# Patient Record
Sex: FEMALE | Race: WHITE
[De-identification: names, ages, dates, MRNs, and addresses within clinical notes are randomized per-mention and may not be internally consistent; named-entity substitution may affect disease eponyms.]

---

## 2017-01-19 ENCOUNTER — HOSPITAL ENCOUNTER (OUTPATIENT)
Dept: HOSPITAL 47 - LABWHC1 | Age: 22
Discharge: HOME | End: 2017-01-19
Payer: COMMERCIAL

## 2017-01-19 DIAGNOSIS — Z34.82: Primary | ICD-10-CM

## 2017-01-19 LAB
CH: 31.2
CHCM: 33
ERYTHROCYTE [DISTWIDTH] IN BLOOD BY AUTOMATED COUNT: 4.06 M/UL (ref 3.8–5.4)
ERYTHROCYTE [DISTWIDTH] IN BLOOD: 13 % (ref 11.5–15.5)
HCT VFR BLD AUTO: 38.5 % (ref 34–46)
HDW: 2.62
HGB BLD-MCNC: 12.5 GM/DL (ref 11.4–16)
MCH RBC QN AUTO: 30.7 PG (ref 25–35)
MCHC RBC AUTO-ENTMCNC: 32.3 G/DL (ref 31–37)
MCV RBC AUTO: 95 FL (ref 80–100)
WBC # BLD AUTO: 8.9 K/UL (ref 3.8–10.6)

## 2017-01-19 PROCEDURE — 82950 GLUCOSE TEST: CPT

## 2017-01-19 PROCEDURE — 85027 COMPLETE CBC AUTOMATED: CPT

## 2017-01-19 PROCEDURE — 36415 COLL VENOUS BLD VENIPUNCTURE: CPT

## 2017-02-12 ENCOUNTER — HOSPITAL ENCOUNTER (OUTPATIENT)
Dept: HOSPITAL 47 - FBPOP | Age: 22
Discharge: HOME | End: 2017-02-12
Payer: COMMERCIAL

## 2017-02-12 DIAGNOSIS — O99.89: Primary | ICD-10-CM

## 2017-02-12 DIAGNOSIS — R10.84: ICD-10-CM

## 2017-02-12 DIAGNOSIS — Z3A.30: ICD-10-CM

## 2017-02-12 PROCEDURE — 84112 EVAL AMNIOTIC FLUID PROTEIN: CPT

## 2017-02-12 PROCEDURE — 99213 OFFICE O/P EST LOW 20 MIN: CPT

## 2017-02-12 PROCEDURE — 59025 FETAL NON-STRESS TEST: CPT

## 2017-03-03 ENCOUNTER — HOSPITAL ENCOUNTER (OUTPATIENT)
Dept: HOSPITAL 47 - RADUSWWP | Age: 22
End: 2017-03-03
Payer: COMMERCIAL

## 2017-03-03 DIAGNOSIS — O36.63X0: Primary | ICD-10-CM

## 2017-03-03 PROCEDURE — 76811 OB US DETAILED SNGL FETUS: CPT

## 2017-03-03 NOTE — US
EXAMINATION TYPE: US OB fetal anatomy transabd

 

DATE OF EXAM: 3/3/2017 2:21 PM

 

COMPARISON: US in PACS

 

HISTORY: LGA

 

TECHNIQUE:  Transabdominal (TA)

 

EXAM MEASUREMENTS:

 

GESTATIONAL AGE / DATING

Physician Established: (32 weeks/5 days)

** EDC:  04/23/2017

Dates by LMP: 

Dates by First Scan:  (32 weeks/5 days)

** EDC: 04/23/2017

Dates by Current Scan for:  (33 weeks/5 days)

** EDC: 04/16/2017

 

FETAL SURVEY

IUP:  Single

PLACENTA: Fundal     

PREVIA: No previa   

** RON: 16.9 cm Normal

CERVICAL LENGTH (transabdominal: norm > 3.0cm): 3.2 cm

 

 

FETAL BIOMETRY

PRESENTATION:   Vertex

 

BPD: 8.7 cm

**  35 weeks / 0 days

HC: 31.9 cm

**  35 weeks / 6 days

AC: 29.7 cm

**  33 weeks / 5 days

FL: 6.3 cm

**  32 weeks / 5 days

ESTIMATED FETAL WEIGHT IN GRAMS:  2273 grams

ESTIMATED FETAL WEIGHT IN LBS/OZS:  5 lbs. 0 oz. 

WEIGHT PERCENTAGE BASED ON ESTABLISHED DATE:  74 %

** HC/AC:  1.07 Normal

** FL/AC:   21 Normal

HEART RATE:  131 bpm 

RHYTHM: Normal

 

ANATOMY SEEN (within normal limits): 

Stomach

Situs

Nose / Lips 

Diaphragm 

Kidneys (bilateral) 

Bladder

Cord Insert 

Three Vessel Cord 

Longitudinal Spine 

Transverse Spine 

 

 

ANATOMY SEEN (does not appear within normal limits):

* Lateral Vent (< 1 cm) 1.2 cm

* Cisterna Magna (< 1.1 cm) 1.2 cm

 

 

ANATOMY NOT SEEN:  

* Cerebellum (varies with age) cm

Choroid Plexus (bilateral)

Midline Falx 

Cavus Septi Pellucidi   

Four Chamber Heart

Outflow tracts:  LVOT/RVOT

Arms (bilateral)

Legs (bilateral)

 

 

IMPRESSION:  Single, viable IUP/ Ventricles and Cisterna Magna dilated

## 2017-03-29 ENCOUNTER — HOSPITAL ENCOUNTER (INPATIENT)
Dept: HOSPITAL 47 - FBPOP | Age: 22
LOS: 2 days | Discharge: HOME | End: 2017-03-31
Payer: COMMERCIAL

## 2017-03-29 DIAGNOSIS — Z3A.36: ICD-10-CM

## 2017-03-29 DIAGNOSIS — O98.319: ICD-10-CM

## 2017-03-29 DIAGNOSIS — A56.8: ICD-10-CM

## 2017-03-29 LAB
BASOPHILS # BLD AUTO: 0 K/UL (ref 0–0.2)
BASOPHILS NFR BLD AUTO: 0 %
CH: 30.3
CHCM: 33.9
EOSINOPHIL # BLD AUTO: 0.2 K/UL (ref 0–0.7)
EOSINOPHIL NFR BLD AUTO: 2 %
ERYTHROCYTE [DISTWIDTH] IN BLOOD BY AUTOMATED COUNT: 4.14 M/UL (ref 3.8–5.4)
ERYTHROCYTE [DISTWIDTH] IN BLOOD: 13.2 % (ref 11.5–15.5)
HCT VFR BLD AUTO: 37.2 % (ref 34–46)
HDW: 2.84
HGB BLD-MCNC: 12.7 GM/DL (ref 11.4–16)
LUC NFR BLD AUTO: 2 %
LYMPHOCYTES # SPEC AUTO: 2 K/UL (ref 1–4.8)
LYMPHOCYTES NFR SPEC AUTO: 18 %
MCH RBC QN AUTO: 30.6 PG (ref 25–35)
MCHC RBC AUTO-ENTMCNC: 34.1 G/DL (ref 31–37)
MCV RBC AUTO: 89.8 FL (ref 80–100)
MONOCYTES # BLD AUTO: 0.6 K/UL (ref 0–1)
MONOCYTES NFR BLD AUTO: 5 %
NEUTROPHILS # BLD AUTO: 8.5 K/UL (ref 1.3–7.7)
NEUTROPHILS NFR BLD AUTO: 74 %
WBC # BLD AUTO: 0.19 10*3/UL
WBC # BLD AUTO: 11.5 K/UL (ref 3.8–10.6)
WBC (PEROX): 11.69

## 2017-03-29 PROCEDURE — 85025 COMPLETE CBC W/AUTO DIFF WBC: CPT

## 2017-03-29 PROCEDURE — 88307 TISSUE EXAM BY PATHOLOGIST: CPT

## 2017-03-29 PROCEDURE — 3E0S3NZ INTRODUCTION OF ANALGESICS, HYPNOTICS, SEDATIVES INTO EPIDURAL SPACE, PERCUTANEOUS APPROACH: ICD-10-PCS

## 2017-03-29 RX ADMIN — ACETAMINOPHEN AND CODEINE PHOSPHATE PRN EACH: 300; 30 TABLET ORAL at 20:06

## 2017-03-29 RX ADMIN — IBUPROFEN PRN MG: 600 TABLET ORAL at 23:48

## 2017-03-29 RX ADMIN — DOCUSATE SODIUM AND SENNOSIDES SCH EACH: 50; 8.6 TABLET ORAL at 20:06

## 2017-03-29 RX ADMIN — ACETAMINOPHEN AND CODEINE PHOSPHATE PRN EACH: 300; 30 TABLET ORAL at 15:30

## 2017-03-29 RX ADMIN — DOCUSATE SODIUM AND SENNOSIDES SCH EACH: 50; 8.6 TABLET ORAL at 09:36

## 2017-03-29 RX ADMIN — IBUPROFEN PRN MG: 600 TABLET ORAL at 13:25

## 2017-03-29 NOTE — P.HPOB
History of Present Illness


H&P Date: 17


Chief Complaint: IUP 36 weeks





BELL is a 22-year-old  at 36 weeks gestation who arrives following 

spontaneous rupture membranes.  She is dilated to 6 cm at presentation.  She 

began having contractions earlier this evening they progressed to fully worse 

ultimately having spontaneous rupture membranes at about 345 morning.  Fluid is 

noted be clear.  Her pregnancy course of incompetent by early diagnosis of 

chlamydia which was verified treated with resolution.  She had some pain issues 

but generally speaking pregnancy was unremarkable up until 34 weeks when an 

ultrasound showed mild dilation of lateral ventricles.  She was sent to high 

risk and [facility] did evaluate her and repeated the ultrasound.  The dilated 

ventricles were noted.  However at that time they did not believe that she be 

delivered at a high-risk Center and she was returned to our care.  Discussion 

with patient yesterday was done on possible delivery at high risk versus 

locally.  However with her dilation to 6 she is not stable for any type of 

transfer therefore we'll plan to deliver here and notify pediatrics.  Pertinent 

labs did include A+ blood type Rh antibody negative rubella immune, hepatitis B 

surface antigen and RPR were both negative.


On physical exam vital signs are stable and afebrile.  Heart regular, lungs 

clear, extremities without pain.  Fetal heart tones were in the 130s 2050s are 

reactive.


Assessment intrauterine pregnancy at 36 weeks.  Plan expect spontaneous vaginal 

delivery.  Antibiotics have been initiated for group B strep





Past Medical History


Past Medical History: No Reported History


History of Any Multi-Drug Resistant Organisms: None Reported


Past Surgical History: No Surgical Hx Reported


Past Anesthesia/Blood Transfusion Reactions: No Reported Reaction


Past Psychological History: No Psychological Hx Reported


Smoking Status: Never smoker


Past Alcohol Use History: None Reported


Past Drug Use History: None Reported





Medications and Allergies


 Home Medications











 Medication  Instructions  Recorded  Confirmed  Type


 


No Known Home Medications [No  16 History





Known Home Medications]    











 Allergies











Allergy/AdvReac Type Severity Reaction Status Date / Time


 


No Known Allergies Allergy   Verified 17 04:13














Exam


Osteopathic Statement: *.  No significant issues noted on an osteopathic 

structural exam other than those noted in the History and Physical/Consult.





- Vital Signs


Vital signs: 


 Vital Signs











  Temp Pulse Resp BP


 


 17 04:13  97.0 F L  88  16  131/63








 Intake and Output











 17





 14:59 22:59 06:59


 


Other:   


 


  Weight   95.708 kg








 Patient Weight











 17





 06:59


 


Weight 95.708 kg














- OBG Physical Exam


Abdomen: bowel sounds normal, no diffuse tenderness, no bruit present, no 

guarding noted, no hepatomegaly, no splenomegaly, no mass


Vulva: both: normal


Vagina: normal moisture, no discharge


Cervix: no lesion, no discharge


Uterus: normal size, normal contour





Results


Result Diagrams: 


 17 04:15





 Abnormal Lab Results - Last 24 Hours (Table)











  17 Range/Units





  04:15 


 


WBC  11.5 H  (3.8-10.6)  k/uL


 


Neutrophils #  8.5 H  (1.3-7.7)  k/uL

## 2017-03-29 NOTE — P.PROBDLV
Vaginal Delivery Note





- .


Vaginal Delivery Note: 





Patient progressed to complete and pushing with spontaneous vaginal delivery of 

a viable female  over an intact perineum.  Following delivery of the head

, a nuchal cord 2 was noted and was noted to be relatively tight therefore 

patient was asked to stop pushing and nuchal cord was reduced 2.  Once this 

was accomplished the remainder the baby was delivered and baby's mouth and 

nares were bulb suctioned.  Baby was then placed on mother's abdomen where the 

umbilical cord was clamped cut usual fashion an nursery personnel was present 

to assume care.  Placenta was then allowed to pulsate for 30 seconds and then 

was clamped and cut.  Placenta was then delivered intact and Pitocin was added 

to the IV.  Apgar scores were 8 and 9 at one and 5 minutes respectively and the 

weight is however pending.  Both mother and baby however currently appear 

stable.  Pediatrics will need to be notified of dilated ventricles for further 

workup.

## 2017-03-30 RX ADMIN — ACETAMINOPHEN AND CODEINE PHOSPHATE PRN EACH: 300; 30 TABLET ORAL at 02:11

## 2017-03-30 RX ADMIN — IBUPROFEN PRN MG: 600 TABLET ORAL at 05:55

## 2017-03-30 RX ADMIN — DOCUSATE SODIUM AND SENNOSIDES SCH EACH: 50; 8.6 TABLET ORAL at 19:24

## 2017-03-30 RX ADMIN — DOCUSATE SODIUM AND SENNOSIDES SCH EACH: 50; 8.6 TABLET ORAL at 10:49

## 2017-03-30 RX ADMIN — ACETAMINOPHEN AND CODEINE PHOSPHATE PRN EACH: 300; 30 TABLET ORAL at 16:31

## 2017-03-30 RX ADMIN — ACETAMINOPHEN AND CODEINE PHOSPHATE PRN EACH: 300; 30 TABLET ORAL at 20:42

## 2017-03-30 RX ADMIN — ACETAMINOPHEN AND CODEINE PHOSPHATE PRN EACH: 300; 30 TABLET ORAL at 10:49

## 2017-03-30 RX ADMIN — IBUPROFEN PRN MG: 600 TABLET ORAL at 14:29

## 2017-03-30 RX ADMIN — IBUPROFEN PRN MG: 600 TABLET ORAL at 22:49

## 2017-03-30 NOTE — P.PNOBGVD
Subjective





- Subjective


Principal diagnosis: Postpartum day 1


Interval history: 





Overall Amy is doing very well.  She is ambulating, voiding and she is 

tolerating her diet.  She voices no complaint.  Baby is in special care 

nursery.  We'll continue to monitor patient for now.  Vital signs are stable 

and afebrile.  Heart regular, lungs clear, extremities without pain.  Abdomen 

is soft uterus is firm.  Assessment postpartum day 1.  Plan discharge to home 

tomorrow


: in NICU (For evaluation of group B strep due to only 1 treatment 

during labor)





Objective





- Latest Vital Signs


Latest vital signs: 


 Vital Signs











  Temp Pulse Resp BP Pulse Ox


 


 17 08:00  97.6 F  69  18  123/81 


 


 17 23:42  97.8 F  71  18  128/76 


 


 17 20:00  97.9 F  71  18  113/81  100








 Intake and Output











 17





 06:59 14:59 22:59


 


Other:   


 


  # Voids 2  














- Exam


Lungs: bilateral: normal


Chest: Normal S1, Normal S2


Extremities: Present: normal


Abdomen: Present: normal appearance, soft


Uterus: Present: normal, firm

## 2017-03-31 VITALS
RESPIRATION RATE: 17 BRPM | TEMPERATURE: 97 F | DIASTOLIC BLOOD PRESSURE: 67 MMHG | SYSTOLIC BLOOD PRESSURE: 121 MMHG | HEART RATE: 76 BPM

## 2017-03-31 RX ADMIN — ACETAMINOPHEN AND CODEINE PHOSPHATE PRN EACH: 300; 30 TABLET ORAL at 10:07

## 2017-03-31 RX ADMIN — DOCUSATE SODIUM AND SENNOSIDES SCH: 50; 8.6 TABLET ORAL at 10:08

## 2017-03-31 RX ADMIN — ACETAMINOPHEN AND CODEINE PHOSPHATE PRN EACH: 300; 30 TABLET ORAL at 00:32

## 2017-03-31 RX ADMIN — IBUPROFEN PRN MG: 600 TABLET ORAL at 13:45

## 2017-03-31 RX ADMIN — IBUPROFEN PRN MG: 600 TABLET ORAL at 07:35

## 2017-05-24 NOTE — P.DS
Providers


Date of admission: 


03/29/17 04:10





Expected date of discharge: 03/31/17


Attending physician: 


Maxx Angulo





Primary care physician: 


Maxx Angulo





Hospital Course: 





Beginning was doing very well postpartum day 2.  She was involuting, voiding, 

tolerating her diet.  She voices no complaints time of her discharge.  She will 

stay for discharge on March 31.  Vital signs are stable and afebrile.  Heart 

regular, lungs clear, extremities without pain.  Pain medication prescriptions 

were provided discharge instructions were thoroughly reviewed and all questions 

were answered for her prior to discharge.  Follow-up with me in 6 weeks





Plan - Discharge Summary


New Discharge Prescriptions: 


Ibuprofen [Motrin] 600 mg PO Q6HR PRN #30 tab


 PRN Reason: Pain


Discharge Medication List





Ibuprofen [Motrin] 600 mg PO Q6HR PRN #30 tab 03/30/17 [Rx]








Follow up Appointment(s)/Referral(s): 


Maxx Angulo DO [Primary Care Provider] - 6 Weeks


Activity/Diet/Wound Care/Special Instructions: 


Heavy lifting, limit stairs and driving and pelvic rest.  If any high 

temperatures, heavy bleeding, or severe pain call my office


Discharge Disposition: HOME SELF-CARE

## 2018-01-23 ENCOUNTER — HOSPITAL ENCOUNTER (EMERGENCY)
Dept: HOSPITAL 47 - EC | Age: 23
Discharge: HOME | End: 2018-01-23
Payer: COMMERCIAL

## 2018-01-23 VITALS
HEART RATE: 89 BPM | TEMPERATURE: 98.8 F | DIASTOLIC BLOOD PRESSURE: 72 MMHG | RESPIRATION RATE: 20 BRPM | SYSTOLIC BLOOD PRESSURE: 149 MMHG

## 2018-01-23 DIAGNOSIS — J06.9: Primary | ICD-10-CM

## 2018-01-23 DIAGNOSIS — Z32.02: ICD-10-CM

## 2018-01-23 DIAGNOSIS — Z53.29: ICD-10-CM

## 2018-01-23 LAB
PH UR: 5.5 [PH] (ref 5–8)
SP GR UR: 1.02 (ref 1–1.03)
UROBILINOGEN UR QL STRIP: <2 MG/DL (ref ?–2)

## 2018-01-23 PROCEDURE — 99284 EMERGENCY DEPT VISIT MOD MDM: CPT

## 2018-01-23 PROCEDURE — 81003 URINALYSIS AUTO W/O SCOPE: CPT

## 2018-01-23 PROCEDURE — 81025 URINE PREGNANCY TEST: CPT

## 2018-01-23 NOTE — ED
URI HPI





- General


Chief Complaint: Upper Respiratory Infection


Stated Complaint: Cough, wants pregnancy test


Time Seen by Provider: 01/23/18 16:39


Source: patient, RN notes reviewed, old records reviewed


Mode of arrival: ambulatory


Limitations: no limitations





- History of Present Illness


Initial Comments: 





23-year-old female presents today chief complaint of cough, congestion, upper 

respiratory symptoms.  She's had these symptoms for a week. Patient reports her 

children had similar symptoms.  She also reports she feels nauseated.  She is 

wondering if she possibly be pregnant.  Last menstrual cycle was at the end of 

December.





- Related Data


 Previous Rx's











 Medication  Instructions  Recorded


 


Ibuprofen [Motrin] 600 mg PO Q6HR PRN #30 tab 03/30/17


 


Benzonatate [Tessalon Perles] 100 mg PO TID PRN #15 capsule 01/23/18


 


Ondansetron Odt [Zofran Odt] 4 mg PO Q8HR PRN #12 tab 01/23/18











 Allergies











Allergy/AdvReac Type Severity Reaction Status Date / Time


 


No Known Allergies Allergy   Verified 01/23/18 16:38














Review of Systems


ROS Statement: 


Those systems with pertinent positive or pertinent negative responses have been 

documented in the HPI.





ROS Other: All systems not noted in ROS Statement are negative.





Past Medical History


Past Medical History: No Reported History


History of Any Multi-Drug Resistant Organisms: None Reported


Past Surgical History: No Surgical Hx Reported


Past Anesthesia/Blood Transfusion Reactions: No Reported Reaction


Past Psychological History: Depression


Smoking Status: Never smoker


Past Alcohol Use History: None Reported


Past Drug Use History: None Reported





General Exam





- General Exam Comments


Initial Comments: 





This is a 23 year old female, no distress





Limitations: no limitations


General appearance: alert, in no apparent distress


Head exam: Present: atraumatic, normocephalic, normal inspection


Eye exam: Present: normal appearance, PERRL, EOMI.  Absent: scleral icterus, 

conjunctival injection, periorbital swelling


ENT exam: Present: normal exam, mucous membranes moist


Neck exam: Present: normal inspection.  Absent: tenderness, meningismus, 

lymphadenopathy


Respiratory exam: Present: normal lung sounds bilaterally.  Absent: respiratory 

distress, wheezes, rales, rhonchi, stridor


Cardiovascular Exam: Present: regular rate, normal rhythm, normal heart sounds.

  Absent: systolic murmur, diastolic murmur, rubs, gallop, clicks


GI/Abdominal exam: Present: soft, normal bowel sounds.  Absent: distended, 

tenderness, guarding, rebound, rigid


Extremities exam: Present: normal inspection, full ROM, normal capillary 

refill.  Absent: tenderness, pedal edema, joint swelling, calf tenderness


Back exam: Present: normal inspection


Neurological exam: Present: alert, oriented X3, CN II-XII intact


Psychiatric exam: Present: normal affect, normal mood


Skin exam: Present: warm, dry, intact, normal color.  Absent: rash





Course


 Vital Signs











  01/23/18





  16:35


 


Temperature 98.8 F


 


Pulse Rate 89


 


Respiratory 20





Rate 


 


Blood Pressure 149/72


 


O2 Sat by Pulse 98





Oximetry 














Medical Decision Making





- Medical Decision Making


23-year-old female presents today chief complaint of cough, congestion, upper 

respiratory symptoms.  She's had these symptoms for a week. Patient reports her 

children had similar symptoms. Which is also concerned for chance of pregnancy 

so she feels nauseated. Patient  should urine analysis is negative for 

infection. Negative hCG. Patient was informed of this. After HCG  negative she 

was sent to go to CXR. Patient refused the chest x-ray. I discussed I can treat 

the patient for like Banerjee respiratory viral syndrome. Will be starting on 

Tessalon Perles and nausea medicine. PatientWe're just leave to get to her 

daughter. 








- Lab Data


 Lab Results











  01/23/18 01/23/18 Range/Units





  16:50 16:50 


 


Urine Color   Yellow  


 


Urine Appearance   Clear  (Clear)  


 


Urine pH   5.5  (5.0-8.0)  


 


Ur Specific Gravity   1.020  (1.001-1.035)  


 


Urine Protein   Negative  (Negative)  


 


Urine Glucose (UA)   Negative  (Negative)  


 


Urine Ketones   Negative  (Negative)  


 


Urine Blood   Negative  (Negative)  


 


Urine Nitrite   Negative  (Negative)  


 


Urine Bilirubin   Negative  (Negative)  


 


Urine Urobilinogen   <2.0  (<2.0)  mg/dL


 


Ur Leukocyte Esterase   Negative  (Negative)  


 


Urine HCG, Qual  Not Detected   (Not Detectd)  














Disposition


Clinical Impression: 


 Upper respiratory infection





Disposition: HOME SELF-CARE


Condition: Good


Instructions:  Upper Respiratory Infection (ED)


Additional Instructions: 


Resident Motrin Tylenol.  Recommend take decongestant medication such as 

Mucinex.  The continues with cough pills and nausea medicine as needed.  Return 

to emergency department if any alarming signs or symptoms occur.


Prescriptions: 


Benzonatate [Tessalon Perles] 100 mg PO TID PRN #15 capsule


 PRN Reason: Cough


Ondansetron Odt [Zofran Odt] 4 mg PO Q8HR PRN #12 tab


 PRN Reason: Nausea


Referrals: 


None,Stated [Primary Care Provider] - 1-2 days


Time of Disposition: 17:26

## 2018-03-09 ENCOUNTER — HOSPITAL ENCOUNTER (EMERGENCY)
Dept: HOSPITAL 47 - EC | Age: 23
Discharge: HOME | End: 2018-03-09
Payer: COMMERCIAL

## 2018-03-09 VITALS
TEMPERATURE: 98 F | HEART RATE: 78 BPM | SYSTOLIC BLOOD PRESSURE: 122 MMHG | DIASTOLIC BLOOD PRESSURE: 72 MMHG | RESPIRATION RATE: 16 BRPM

## 2018-03-09 DIAGNOSIS — Z3A.10: ICD-10-CM

## 2018-03-09 DIAGNOSIS — R10.9: ICD-10-CM

## 2018-03-09 DIAGNOSIS — R11.0: ICD-10-CM

## 2018-03-09 DIAGNOSIS — O99.89: Primary | ICD-10-CM

## 2018-03-09 DIAGNOSIS — O20.9: ICD-10-CM

## 2018-03-09 LAB
ANION GAP SERPL CALC-SCNC: 7 MMOL/L
BASOPHILS # BLD AUTO: 0.1 K/UL (ref 0–0.2)
BASOPHILS NFR BLD AUTO: 1 %
BUN SERPL-SCNC: 16 MG/DL (ref 7–17)
CALCIUM SPEC-MCNC: 9.5 MG/DL (ref 8.4–10.2)
CHLORIDE SERPL-SCNC: 105 MMOL/L (ref 98–107)
CO2 SERPL-SCNC: 23 MMOL/L (ref 22–30)
EOSINOPHIL # BLD AUTO: 0.8 K/UL (ref 0–0.7)
EOSINOPHIL NFR BLD AUTO: 7 %
ERYTHROCYTE [DISTWIDTH] IN BLOOD BY AUTOMATED COUNT: 4.34 M/UL (ref 3.8–5.4)
ERYTHROCYTE [DISTWIDTH] IN BLOOD: 12.9 % (ref 11.5–15.5)
GLUCOSE SERPL-MCNC: 103 MG/DL (ref 74–99)
HCT VFR BLD AUTO: 37.9 % (ref 34–46)
HGB BLD-MCNC: 12.8 GM/DL (ref 11.4–16)
LYMPHOCYTES # SPEC AUTO: 2.9 K/UL (ref 1–4.8)
LYMPHOCYTES NFR SPEC AUTO: 26 %
MCH RBC QN AUTO: 29.4 PG (ref 25–35)
MCHC RBC AUTO-ENTMCNC: 33.7 G/DL (ref 31–37)
MCV RBC AUTO: 87.4 FL (ref 80–100)
MONOCYTES # BLD AUTO: 0.4 K/UL (ref 0–1)
MONOCYTES NFR BLD AUTO: 4 %
NEUTROPHILS # BLD AUTO: 6.7 K/UL (ref 1.3–7.7)
NEUTROPHILS NFR BLD AUTO: 61 %
PH UR: 5.5 [PH] (ref 5–8)
PLATELET # BLD AUTO: 238 K/UL (ref 150–450)
POTASSIUM SERPL-SCNC: 3.8 MMOL/L (ref 3.5–5.1)
RBC UR QL: 1 /HPF (ref 0–5)
SODIUM SERPL-SCNC: 135 MMOL/L (ref 137–145)
SP GR UR: 1.02 (ref 1–1.03)
SQUAMOUS UR QL AUTO: 7 /HPF (ref 0–4)
UROBILINOGEN UR QL STRIP: <2 MG/DL (ref ?–2)
WBC # BLD AUTO: 11 K/UL (ref 3.8–10.6)
WBC #/AREA URNS HPF: 2 /HPF (ref 0–5)

## 2018-03-09 PROCEDURE — 96360 HYDRATION IV INFUSION INIT: CPT

## 2018-03-09 PROCEDURE — 80048 BASIC METABOLIC PNL TOTAL CA: CPT

## 2018-03-09 PROCEDURE — 76801 OB US < 14 WKS SINGLE FETUS: CPT

## 2018-03-09 PROCEDURE — 84702 CHORIONIC GONADOTROPIN TEST: CPT

## 2018-03-09 PROCEDURE — 99284 EMERGENCY DEPT VISIT MOD MDM: CPT

## 2018-03-09 PROCEDURE — 36415 COLL VENOUS BLD VENIPUNCTURE: CPT

## 2018-03-09 PROCEDURE — 85025 COMPLETE CBC W/AUTO DIFF WBC: CPT

## 2018-03-09 PROCEDURE — 81001 URINALYSIS AUTO W/SCOPE: CPT

## 2018-03-09 NOTE — ED
Abdominal Pain HPI





- General


Chief Complaint: Abdominal Pain


Stated Complaint: 10 weeks & cramping


Time Seen by Provider: 18 19:05


Source: patient


Mode of arrival: ambulatory


Limitations: no limitations





- History of Present Illness


Initial Comments: 


The patient is a  female who presents with a chief complaint of abdominal 

pain.  The patient is about 10 weeks pregnant by last menstrual period.  The 

patient describes her pain as crampy in nature.  She states it feels like 

period cramps.  She cannot identify any inciting incidences.  There are no 

aggravating or alleviating factors.  Timing is constant.  The patient denies 

any dysuria, vaginal bleeding, or vaginal discharge.  Patient does endorse some 

nausea however she states with her last pregnancies she had morning sickness 

and this is similar.








- Related Data


 Home Medications











 Medication  Instructions  Recorded  Confirmed


 


Pnv,Calcium 72/Iron/Folic Acid 1 tab PO DAILY 18





[Prenatal Plus Tablet]   











 Allergies











Allergy/AdvReac Type Severity Reaction Status Date / Time


 


No Known Allergies Allergy   Verified 18 19:15














Review of Systems


ROS Statement: 


Those systems with pertinent positive or pertinent negative responses have been 

documented in the HPI.





ROS Other: All systems not noted in ROS Statement are negative.


Gastrointestinal: Reports: abdominal pain, nausea





Past Medical History


Past Medical History: No Reported History


History of Any Multi-Drug Resistant Organisms: None Reported


Past Surgical History: No Surgical Hx Reported


Past Anesthesia/Blood Transfusion Reactions: No Reported Reaction


Past Psychological History: Depression


Smoking Status: Never smoker


Past Alcohol Use History: None Reported


Past Drug Use History: None Reported





General Exam


Limitations: no limitations


General appearance: alert, in no apparent distress


Head exam: Present: atraumatic, normocephalic


Eye exam: Present: normal appearance, PERRL


ENT exam: Present: mucous membranes moist


Neck exam: Present: normal inspection


Respiratory exam: Present: normal lung sounds bilaterally.  Absent: respiratory 

distress, wheezes


Cardiovascular Exam: Present: regular rate, normal rhythm


GI/Abdominal exam: Present: soft, tenderness.  Absent: distended


Rectal exam: Present: deferred (Patient has tenderness in the lower abdomen 

with palpation, patient is more tender on the right than on the left)


Extremities exam: Present: normal inspection


Back exam: Present: normal inspection


Neurological exam: Present: alert, oriented X3


Psychiatric exam: Present: normal affect, normal mood





Course


 Vital Signs











  18





  18:42


 


Temperature 97.1 F L


 


Pulse Rate 62


 


Respiratory 20





Rate 


 


Blood Pressure 129/80


 


O2 Sat by Pulse 100





Oximetry 














Medical Decision Making





- Medical Decision Making


Patient is a  female who presents with the chief complaint cramping 

abdominal pain.  The patient is 7 weeks pregnant by last menstrual period.  

Patient follows up with Dr. Martinez however she has not had her initial OB 

ultrasound yet.  On initial evaluation, vital signs are stable, patient is in 

no acute distress.  Patient will be evaluated basic labs, urinalysis, and 

transvaginal ultrasound.





9:18 p.m.


Evaluation of this patient is unremarkable.  Ultrasound evaluation shows a 

single intrauterine pregnancy at 10 weeks and 3 days.  There is a very small 

subchorionic hemorrhage.  Patient was informed of these results, she was 

instructed to follow up with her OB/GYN, primary care in 1-2 days.  She is 

further instructed to return to the emergency department if her symptoms worsen 

or change.  Patient was instructed on using Tylenol for pain.  After 2 L of IV 

fluid, she feels improved in the emergency department.  Patient was further 

advised to stay well-hydrated.








- Lab Data


Result diagrams: 


 18 19:40





 18 19:40


 Lab Results











  18 Range/Units





  19:40 19:40 19:40 


 


WBC   11.0 H   (3.8-10.6)  k/uL


 


RBC   4.34   (3.80-5.40)  m/uL


 


Hgb   12.8   (11.4-16.0)  gm/dL


 


Hct   37.9   (34.0-46.0)  %


 


MCV   87.4   (80.0-100.0)  fL


 


MCH   29.4   (25.0-35.0)  pg


 


MCHC   33.7   (31.0-37.0)  g/dL


 


RDW   12.9   (11.5-15.5)  %


 


Plt Count   238   (150-450)  k/uL


 


Neutrophils %   61   %


 


Lymphocytes %   26   %


 


Monocytes %   4   %


 


Eosinophils %   7   %


 


Basophils %   1   %


 


Neutrophils #   6.7   (1.3-7.7)  k/uL


 


Lymphocytes #   2.9   (1.0-4.8)  k/uL


 


Monocytes #   0.4   (0-1.0)  k/uL


 


Eosinophils #   0.8 H   (0-0.7)  k/uL


 


Basophils #   0.1   (0-0.2)  k/uL


 


Sodium  135 L    (137-145)  mmol/L


 


Potassium  3.8    (3.5-5.1)  mmol/L


 


Chloride  105    ()  mmol/L


 


Carbon Dioxide  23    (22-30)  mmol/L


 


Anion Gap  7    mmol/L


 


BUN  16    (7-17)  mg/dL


 


Creatinine  0.56    (0.52-1.04)  mg/dL


 


Est GFR (CKD-EPI)AfAm  >90    (>60 ml/min/1.73 sqM)  


 


Est GFR (CKD-EPI)NonAf  >90    (>60 ml/min/1.73 sqM)  


 


Glucose  103 H    (74-99)  mg/dL


 


Calcium  9.5    (8.4-10.2)  mg/dL


 


Urine Color    Yellow  


 


Urine Appearance    Cloudy H  (Clear)  


 


Urine pH    5.5  (5.0-8.0)  


 


Ur Specific Gravity    1.016  (1.001-1.035)  


 


Urine Protein    Negative  (Negative)  


 


Urine Glucose (UA)    Negative  (Negative)  


 


Urine Ketones    Negative  (Negative)  


 


Urine Blood    Negative  (Negative)  


 


Urine Nitrite    Negative  (Negative)  


 


Urine Bilirubin    Negative  (Negative)  


 


Urine Urobilinogen    <2.0  (<2.0)  mg/dL


 


Ur Leukocyte Esterase    Trace H  (Negative)  


 


Urine RBC    1  (0-5)  /hpf


 


Urine WBC    2  (0-5)  /hpf


 


Ur Squamous Epith Cells    7 H  (0-4)  /hpf


 


Urine Bacteria    Rare H  (None)  /hpf


 


Urine Mucus    Rare H  (None)  /hpf














Disposition


Clinical Impression: 


 Abdominal cramping affecting pregnancy





Disposition: HOME SELF-CARE


Condition: Good


Instructions:  Abdominal Pain in Pregnancy  (ED)


Referrals: 


Maxx Angulo DO [Doctor of Osteopathic Medicine] - 1-2 days


Olinda Altamirano MD [Medical Doctor] - 1-2 days

## 2018-03-09 NOTE — US
EXAMINATION TYPE: US OB <= 14 wk fetus

 

DATE OF EXAM: 3/9/2018

 

COMPARISON: NONE

 

CLINICAL HISTORY: Cramping.

 

EXAM PERFORMED:  Transabdominal (TA)

 

EXAM MEASUREMENTS:

 

GESTATIONAL AGE / DATING

 

Physician Established: (10 weeks/3 days) 

** EDC:  10/2/2018

Dates by LMP: (10 weeks/3 days)  

** EDC: 10/2/2018

Dates by First Scan:  No previous this is first scan 

Dates by Current Scan for: (10 weeks/0 days)  

** EDC: 10/05/2018

 

MATERNAL ANATOMY 

 

Uterus: 13.2 x 7.8 x 9.6 cm

Right Ovary: 3.6 x 1.5 x 2.4 cm

Left Ovary: 2.6 x 1.2 x 1.8  cm 

Post CDS / Adnexa: wnl

Presence of free fluid: No

Presence of corpus luteal cyst: No

Presence of subchorionic bleed: Yes, to the right of the gestational sac measuring 0.8 x 0.3 x 0.4 cm
  

 

GESTATION / FETAL SURVEY

 

CRL: 3.1cm (10 weeks/0 days)

Yolk Sac (normal less than 6mm): 3 mm

Heart Rate: 172 bpm

Rhythm:  Normal

IUP:  Viable IUP

 

 

Date of LMP: 12/26/2017

Beta HcG (if available): Not available at this time

 

Viable IUP, measurements consistent with dates. 

 

 

 

IMPRESSION:

Small subchorionic hemorrhage. This measures just 8 x 3 mm. Living single fetus with gestational age 
of 10 weeks.

## 2018-07-06 ENCOUNTER — HOSPITAL ENCOUNTER (OUTPATIENT)
Dept: HOSPITAL 47 - LABWHC1 | Age: 23
Discharge: HOME | End: 2018-07-06
Payer: COMMERCIAL

## 2018-07-06 DIAGNOSIS — Z3A.00: ICD-10-CM

## 2018-07-06 DIAGNOSIS — Z34.82: Primary | ICD-10-CM

## 2018-07-06 LAB
ERYTHROCYTE [DISTWIDTH] IN BLOOD BY AUTOMATED COUNT: 4.07 M/UL (ref 3.8–5.4)
ERYTHROCYTE [DISTWIDTH] IN BLOOD: 13.9 % (ref 11.5–15.5)
HCT VFR BLD AUTO: 38.5 % (ref 34–46)
HGB BLD-MCNC: 12.7 GM/DL (ref 11.4–16)
MCH RBC QN AUTO: 31.2 PG (ref 25–35)
MCHC RBC AUTO-ENTMCNC: 33 G/DL (ref 31–37)
MCV RBC AUTO: 94.5 FL (ref 80–100)
PLATELET # BLD AUTO: 243 K/UL (ref 150–450)
WBC # BLD AUTO: 8.2 K/UL (ref 3.8–10.6)

## 2018-07-06 PROCEDURE — 82950 GLUCOSE TEST: CPT

## 2018-07-06 PROCEDURE — 36415 COLL VENOUS BLD VENIPUNCTURE: CPT

## 2018-07-06 PROCEDURE — 85027 COMPLETE CBC AUTOMATED: CPT

## 2018-07-20 ENCOUNTER — HOSPITAL ENCOUNTER (OUTPATIENT)
Dept: HOSPITAL 47 - FBPOP | Age: 23
Discharge: HOME | End: 2018-07-20
Attending: OBSTETRICS & GYNECOLOGY
Payer: COMMERCIAL

## 2018-07-20 VITALS
HEART RATE: 90 BPM | TEMPERATURE: 97 F | DIASTOLIC BLOOD PRESSURE: 74 MMHG | SYSTOLIC BLOOD PRESSURE: 128 MMHG | RESPIRATION RATE: 16 BRPM

## 2018-07-20 DIAGNOSIS — O36.8131: Primary | ICD-10-CM

## 2018-07-20 DIAGNOSIS — Z3A.29: ICD-10-CM

## 2018-07-20 PROCEDURE — 59025 FETAL NON-STRESS TEST: CPT

## 2018-07-20 PROCEDURE — 99213 OFFICE O/P EST LOW 20 MIN: CPT

## 2018-07-21 NOTE — P.MSEPDOC
Presenting Problems





- Arrival Data


Date of Arrival on Unit: 18


Time of Arrival on Unit: 17:39


Mode of Transport: Ambulatory





- Complaint


OB-Reason for Admission/Chief Complaint: Decreased Fetal Movement





Prenatal Medical History





- Pregnancy Information


: 4


Para: 3


Term: 1


: 2


Abortions: Spontaneous or Elective: 0


Number of Living Children: 3





- Gestational Age


Gestational Age by LYNSEY (wks/days): 29 Weeks and 4 Days





Review of Systems





- Review of Systems


Constitutional: No problems


Breast: No problems


ENT: No problems


Cardiovascular: No problems


Respiratory: No problems


Gastrointestinal: No problems


Genitourinary: No problems


Musculoskeletal: No problems


Neurological: No problems


Skin: No problems


Comment: pt reports wheezing during activity, but not having any wheezing now





Vital Signs





- Temperature


Temperature: 97.0 F


Temperature Source: Tympanic





- Pulse


  ** Right Brachial


Pulse Rate: 90


Pulse Assessment Method: Automatic Cuff





- Respirations


Respiratory Rate: 16


Oxygen Delivery Method: Room Air





- Blood Pressure


  ** Right Arm


Blood Pressure: 128/74


Blood Pressure Mean: 92


Blood Pressure Source: Automatic Cuff





Medical Screen Scoring (Pre)





- Cervical Exam


Dilation: Exam Deferred


Effacement: Exam Deferred


Membranes: Intact





- Uterine Contractions


Frequency: N/A


Duration: N/A


Intensity: N/A





- Maternal Vital Signs


Maternal Temperature: N/A


Maternal Blood Pressure: N/A


Signs of Preeclampsia: N/A


Maternal Respirations: N/A





- Pain Assessment


Pain Scale Used: Numeric (1 - 10)


Pain Intensity: 0


Pain Management Goal: 0





- Maternal Trauma


Maternal Trauma: N/A





- Fetal Assessment


Baseline FHR: 125


Fetal Heart Rate - NICHD Category: Category I (Normal) = 0


NST: Reactive


Fetal Position: N/A


Fetal Station: N/A





- Total Score


Total Score (Pre): 0





- Level of Risk


Level of Risk: N/A





Physician Notification (Pre)





- Physician Notified


Physician Notified Date: 18


Physician Notified Time: 18:29


Physician/Practitioner Notifed:: Dr. Early


Spoke With: Dr. Early


New Order Received: Yes





- Notification Comment


Comment: d/c home





Disposition





- Disposition


OB Disposition: Discharge to home


Discharge Date: 18


Discharge Time: 18:33


I agree with the RN Medical Screening Exam: Yes


Risk & Benefit of care provided described in d/c instruction: Yes


Diagnosis: DECREASED FETAL MOVEMENTS, THIRD TRIMESTER, FETUS 1

## 2018-09-06 ENCOUNTER — HOSPITAL ENCOUNTER (OUTPATIENT)
Dept: HOSPITAL 47 - FBPOP | Age: 23
Discharge: HOME | End: 2018-09-06
Attending: OBSTETRICS & GYNECOLOGY
Payer: COMMERCIAL

## 2018-09-06 VITALS
DIASTOLIC BLOOD PRESSURE: 77 MMHG | SYSTOLIC BLOOD PRESSURE: 129 MMHG | HEART RATE: 93 BPM | TEMPERATURE: 97.8 F | RESPIRATION RATE: 16 BRPM

## 2018-09-06 DIAGNOSIS — Z3A.37: ICD-10-CM

## 2018-09-06 DIAGNOSIS — O47.1: Primary | ICD-10-CM

## 2018-09-06 PROCEDURE — 99213 OFFICE O/P EST LOW 20 MIN: CPT

## 2018-09-06 PROCEDURE — 59025 FETAL NON-STRESS TEST: CPT

## 2018-09-16 NOTE — P.MSEPDOC
Presenting Problems





- Arrival Data


Date of Arrival on Unit: 18


Time of Arrival on Unit: 18:43


Mode of Transport: Ambulatory





- Complaint


OB-Reason for Admission/Chief Complaint: Possible Onset of Labor





Prenatal Medical History





- Pregnancy Information


: 4


Para: 3


Term: 1


: 2


Abortions: Spontaneous or Elective: 0


Number of Living Children: 2





- Gestational Age


Gestational Age by LYNSEY (wks/days): 37 Weeks and 3 Days





- Prenatal History


Pregnancy Complications: GBS+





Review of Systems





- Review of Systems


Constitutional: No problems


Breast: No problems


ENT: No problems


Cardiovascular: No problems


Respiratory: No problems


Gastrointestinal: No problems


Genitourinary: No problems


Musculoskeletal: No problems


Neurological: No problems


Skin: No problems





Vital Signs





- Temperature


Temperature: 97.8 F


Temperature Source: Oral





- Pulse


  ** Right Brachial


Pulse Rate: 93


Pulse Assessment Method: Automatic Cuff





- Respirations


Respiratory Rate: 16


Oxygen Delivery Method: Room Air


O2 Sat by Pulse Oximetry: 97





- Blood Pressure


  ** Right Arm


Blood Pressure: 129/77


Blood Pressure Mean: 94


Blood Pressure Source: Automatic Cuff





Medical Screen Scoring (Pre)





- Cervical Exam


Dilation: 4-7 cm = 2


Membranes: Intact





- Uterine Contractions


Frequency: > or = 36 weeks =2





- Maternal Vital Signs


Maternal Temperature: N/A


Maternal Blood Pressure: N/A


Signs of Preeclampsia: N/A


Maternal Respirations: N/A





- Pain Assessment


Pain Location and Character: Abdomen


Pain Scale Used: Numeric (1 - 10)


Pain Intensity: 3


Pain Description: Cramping


Pain Frequency: Intermittent


Pain Duration Units: Minutes


Pain Behavior: None Exhibited


Pain Aggravating Factors: Contractions





- Maternal Trauma


Maternal Trauma: N/A





- Fetal Assessment


Baseline FHR: 135


Fetal Heart Rate - NICHD Category: Category I (Normal) = 0


NST: Reactive


Fetal Position: N/A


Fetal Station: N/A





- Total Score


Total Score (Pre): 4





- Level of Risk


Level of Risk: Low (0-5)





Physician Notification (Pre)





- Physician Notified


Physician Notified Date: 18


Physician Notified Time: 20:23


Physician/Practitioner Notifed:: Dr. Angulo


Spoke With: Dr. Angulo


New Order Received: Yes





- Notification Comment


Comment: Dr. Angulo called and given report on pt in triage. Pt c/o. VS WNL. 

Reactive nst. Vag exam of 4/50/-2 with minimal change. Orders recieved to 

either keep pt for one more hour and recheck or to allow pt to be d/c to home 

pt prefers.





Disposition





- Disposition


OB Disposition: Discharge to home


Discharge Date: 18


Discharge Time: 20:30


I agree with the RN Medical Screening Exam: Yes


Risk & Benefit of care provided described in d/c instruction: Yes


Diagnosis: FALSE LABOR AT OR AFTER 37 COMPLETED WEEKS OF GESTATION

## 2018-09-22 ENCOUNTER — HOSPITAL ENCOUNTER (OUTPATIENT)
Dept: HOSPITAL 47 - FBPOP | Age: 23
Discharge: HOME | End: 2018-09-22
Attending: OBSTETRICS & GYNECOLOGY
Payer: COMMERCIAL

## 2018-09-22 VITALS
DIASTOLIC BLOOD PRESSURE: 68 MMHG | SYSTOLIC BLOOD PRESSURE: 109 MMHG | HEART RATE: 96 BPM | TEMPERATURE: 98 F | RESPIRATION RATE: 16 BRPM

## 2018-09-22 DIAGNOSIS — Z3A.38: ICD-10-CM

## 2018-09-22 DIAGNOSIS — O36.8130: Primary | ICD-10-CM

## 2018-09-22 PROCEDURE — 99213 OFFICE O/P EST LOW 20 MIN: CPT

## 2018-09-22 PROCEDURE — 59025 FETAL NON-STRESS TEST: CPT

## 2018-09-23 NOTE — P.MSEPDOC
Presenting Problems





- Arrival Data


Date of Arrival on Unit: 18


Time of Arrival on Unit: 17:45


Mode of Transport: Ambulatory





- Complaint


OB-Reason for Admission/Chief Complaint: Decreased Fetal Movement





Prenatal Medical History





- Pregnancy Information


: 4


Para: 3


Term: 1


: 2


Abortions: Spontaneous or Elective: 0


Number of Living Children: 3





- Gestational Age


Gestational Age by LYNSEY (wks/days): 38 Weeks and 4 Days





Review of Systems





- Review of Systems


Constitutional: No problems


Breast: No problems


ENT: No problems


Cardiovascular: No problems


Respiratory: No problems


Gastrointestinal: No problems


Genitourinary: No problems


Musculoskeletal: No problems


Neurological: No problems


Skin: No problems





Vital Signs





- Temperature


Temperature: 98 F


Temperature Source: Temporal Artery Scan





- Pulse


  ** Right Brachial


Pulse Rate: 96


Pulse Assessment Method: Automatic Cuff





- Respirations


Respiratory Rate: 16


Oxygen Delivery Method: Room Air





- Blood Pressure


  ** Right Arm


Blood Pressure: 109/68


Blood Pressure Mean: 81


Blood Pressure Source: Automatic Cuff





Medical Screen Scoring (Pre)





- Uterine Contractions


Frequency: > or = 36 weeks =2


Duration: > 40 seconds = 2


Intensity: N/A





- Maternal Vital Signs


Maternal Temperature: N/A


Maternal Blood Pressure: N/A


Signs of Preeclampsia: N/A


Maternal Respirations: N/A





- Pain Assessment


Pain Scale Used: Numeric (1 - 10)


Pain Intensity: 0





- Maternal Trauma


Maternal Trauma: N/A





- Fetal Assessment


Baseline FHR: 120


Fetal Heart Rate - NICHD Category: Category I (Normal) = 0


NST: Reactive


Fetal Position: N/A


Fetal Station: N/A





- Total Score


Total Score (Pre): 4





- Level of Risk


Level of Risk: Low (0-5)





Medical Screen Scoring (Post)





- Cervical Exam


Dilation: 4-7 cm = 2


Effacement: More than 50% = 2


Membranes: Intact





- Uterine Contractions


Frequency: > 5 minutes apart = 1


Duration: > 40 seconds = 2





- Total Score


Total Score (Post): 7





- Post Treatment Level of Risk


Post Treatment Level of Risk: Medium (6-9)





Physician Notification (Post)





- Physician Notified


Physician Notified Date: 18


Physician Notified Time: 17:30


Spoke With: Moe Brand Order Received: Yes (discharge with instruction)





- Notification Comment


Comment: scheduled for induction on Tuesday





Disposition





- Disposition


OB Disposition: Discharge to home, Written follow up instructions reviewed


Discharge Date: 18


Discharge Time: 17:30


I agree with the RN Medical Screening Exam: Yes


Risk & Benefit of care provided described in d/c instruction: Yes


Diagnosis: DECREASED FETAL MOVEMENTS, THIRD TRIMESTER, UNSP

## 2018-09-25 ENCOUNTER — HOSPITAL ENCOUNTER (INPATIENT)
Dept: HOSPITAL 47 - 4FBP | Age: 23
LOS: 2 days | Discharge: HOME | End: 2018-09-27
Attending: OBSTETRICS & GYNECOLOGY | Admitting: OBSTETRICS & GYNECOLOGY
Payer: COMMERCIAL

## 2018-09-25 VITALS — BODY MASS INDEX: 33.5 KG/M2

## 2018-09-25 DIAGNOSIS — Z3A.39: ICD-10-CM

## 2018-09-25 DIAGNOSIS — Z87.891: ICD-10-CM

## 2018-09-25 LAB
BASOPHILS # BLD AUTO: 0.1 K/UL (ref 0–0.2)
BASOPHILS NFR BLD AUTO: 1 %
EOSINOPHIL # BLD AUTO: 0.6 K/UL (ref 0–0.7)
EOSINOPHIL NFR BLD AUTO: 5 %
ERYTHROCYTE [DISTWIDTH] IN BLOOD BY AUTOMATED COUNT: 4.22 M/UL (ref 3.8–5.4)
ERYTHROCYTE [DISTWIDTH] IN BLOOD: 14.4 % (ref 11.5–15.5)
HCT VFR BLD AUTO: 37.4 % (ref 34–46)
HGB BLD-MCNC: 12 GM/DL (ref 11.4–16)
LYMPHOCYTES # SPEC AUTO: 2.2 K/UL (ref 1–4.8)
LYMPHOCYTES NFR SPEC AUTO: 20 %
MCH RBC QN AUTO: 28.5 PG (ref 25–35)
MCHC RBC AUTO-ENTMCNC: 32.2 G/DL (ref 31–37)
MCV RBC AUTO: 88.5 FL (ref 80–100)
MONOCYTES # BLD AUTO: 0.5 K/UL (ref 0–1)
MONOCYTES NFR BLD AUTO: 5 %
NEUTROPHILS # BLD AUTO: 7.4 K/UL (ref 1.3–7.7)
NEUTROPHILS NFR BLD AUTO: 68 %
PLATELET # BLD AUTO: 266 K/UL (ref 150–450)
WBC # BLD AUTO: 10.9 K/UL (ref 3.8–10.6)

## 2018-09-25 PROCEDURE — 00HU33Z INSERTION OF INFUSION DEVICE INTO SPINAL CANAL, PERCUTANEOUS APPROACH: ICD-10-PCS

## 2018-09-25 PROCEDURE — 86900 BLOOD TYPING SEROLOGIC ABO: CPT

## 2018-09-25 PROCEDURE — 3E0R3NZ INTRODUCTION OF ANALGESICS, HYPNOTICS, SEDATIVES INTO SPINAL CANAL, PERCUTANEOUS APPROACH: ICD-10-PCS

## 2018-09-25 PROCEDURE — 86901 BLOOD TYPING SEROLOGIC RH(D): CPT

## 2018-09-25 PROCEDURE — 10907ZC DRAINAGE OF AMNIOTIC FLUID, THERAPEUTIC FROM PRODUCTS OF CONCEPTION, VIA NATURAL OR ARTIFICIAL OPENING: ICD-10-PCS

## 2018-09-25 PROCEDURE — 85025 COMPLETE CBC W/AUTO DIFF WBC: CPT

## 2018-09-25 PROCEDURE — 86850 RBC ANTIBODY SCREEN: CPT

## 2018-09-25 RX ADMIN — AMPICILLIN SODIUM SCH: 1 INJECTION, POWDER, FOR SOLUTION INTRAMUSCULAR; INTRAVENOUS at 20:43

## 2018-09-25 RX ADMIN — DOCUSATE SODIUM AND SENNOSIDES SCH EACH: 50; 8.6 TABLET ORAL at 20:45

## 2018-09-25 RX ADMIN — AMPICILLIN SODIUM SCH MLS/HR: 1 INJECTION, POWDER, FOR SOLUTION INTRAMUSCULAR; INTRAVENOUS at 11:02

## 2018-09-25 RX ADMIN — IBUPROFEN PRN MG: 600 TABLET ORAL at 16:19

## 2018-09-25 RX ADMIN — POTASSIUM CHLORIDE SCH MLS/HR: 14.9 INJECTION, SOLUTION INTRAVENOUS at 10:33

## 2018-09-25 RX ADMIN — AMPICILLIN SODIUM SCH: 1 INJECTION, POWDER, FOR SOLUTION INTRAMUSCULAR; INTRAVENOUS at 18:45

## 2018-09-25 RX ADMIN — ACETAMINOPHEN PRN MG: 325 TABLET, FILM COATED ORAL at 20:44

## 2018-09-25 RX ADMIN — POTASSIUM CHLORIDE SCH MLS/HR: 14.9 INJECTION, SOLUTION INTRAVENOUS at 06:55

## 2018-09-25 NOTE — P.PROBDLV
Vaginal Delivery Note





- .


Vaginal Delivery Note: 





Patient progressed complete and pushed with spontaneous vaginal delivery of a 

viable female  over an intact perineum.  Falling deliver the head 

anterior posterior shoulders were delivered gentle downward and upward traction 

followed by the remainder the baby.  Mouth and nares were then bulb suctioned 

and baby was placed on mother's abdomen where the umbilical cord was clamped 

and cut in usual fashion.  Nursery personnel was then present to assume care.  

Ascent was then delivered intact and Pitocin was added to the IV.  Apgar scores 

and weight are pending.  Both mother and baby however, appear stable.

## 2018-09-25 NOTE — P.HPOB
History of Present Illness


H&P Date: 18


Chief Complaint: Induction of labor





Patient is a 23 0  at 39 weeks gestation arise for induction of labor.  

She does have advanced cervical dilatation and she is dilated to 5 cm this 

morning.  We have waited on rupturing her membranes that she can get 2 doses of 

antibiotics prior to delivery due to her group B strep status.  Her Precis 

course was, complicated by Chlamydia early in the pregnancy which was treated 

and verification of cure was done.  Otherwise she has had no significant 

problems and she is feeling well at this time.  She was followed with maternal 

fetal medicine due to an EIF on ultrasound but no treatments have been 

recommended.  Fetal heart tones were in the 130s and reactive this morning and 

she voices no complaints.  Pitocin augmentation of labor will be done and 

expectation for artificial rupture membranes once second dose of antibiotics 

completed.





Past Medical History


Past Medical History: No Reported History


History of Any Multi-Drug Resistant Organisms: None Reported


Past Surgical History: Adenoidectomy, Tonsillectomy


Past Anesthesia/Blood Transfusion Reactions: No Reported Reaction


Past Psychological History: Depression


Smoking Status: Former smoker


Past Alcohol Use History: None Reported


Past Drug Use History: None Reported





- Past Family History


  ** Father


Family Medical History: No Reported History





Medications and Allergies


 Home Medications











 Medication  Instructions  Recorded  Confirmed  Type


 


Pnv,Calcium 72/Iron/Folic Acid 1 tab PO DAILY 18 History





[Prenatal Plus Tablet]    











 Allergies











Allergy/AdvReac Type Severity Reaction Status Date / Time


 


No Known Allergies Allergy   Verified 18 06:44














Exam


Osteopathic Statement: *.  No significant issues noted on an osteopathic 

structural exam other than those noted in the History and Physical/Consult.


 Vital Signs











  Temp Pulse Resp BP


 


 18 06:45  97.3 F L  111 H  16  127/69








 Intake and Output











 18





 22:59 06:59 14:59


 


Other:   


 


  Weight  108.862 kg 














Results


Result Diagrams: 


 18 06:50





 Abnormal Lab Results - Last 24 Hours (Table)











  18 Range/Units





  06:50 


 


WBC  10.9 H  (3.8-10.6)  k/uL

## 2018-09-26 VITALS — RESPIRATION RATE: 16 BRPM

## 2018-09-26 RX ADMIN — DOCUSATE SODIUM AND SENNOSIDES SCH: 50; 8.6 TABLET ORAL at 21:36

## 2018-09-26 RX ADMIN — IBUPROFEN PRN MG: 600 TABLET ORAL at 15:10

## 2018-09-26 RX ADMIN — DOCUSATE SODIUM AND SENNOSIDES SCH: 50; 8.6 TABLET ORAL at 08:37

## 2018-09-26 RX ADMIN — ACETAMINOPHEN PRN MG: 325 TABLET, FILM COATED ORAL at 18:45

## 2018-09-26 RX ADMIN — ACETAMINOPHEN PRN MG: 325 TABLET, FILM COATED ORAL at 12:46

## 2018-09-26 RX ADMIN — IBUPROFEN PRN MG: 600 TABLET ORAL at 21:35

## 2018-09-26 RX ADMIN — ACETAMINOPHEN PRN MG: 325 TABLET, FILM COATED ORAL at 05:29

## 2018-09-26 RX ADMIN — IBUPROFEN PRN MG: 600 TABLET ORAL at 01:22

## 2018-09-26 RX ADMIN — IBUPROFEN PRN MG: 600 TABLET ORAL at 08:33

## 2018-09-26 NOTE — P.PNOBGVD
Subjective





- Subjective


Principal diagnosis: post partum day 1


Interval history: 





overall doing well. c/o increase in pelvic pain over yesterday but is 

ambulating. discussed r/b of narcotics mitchell with breastfeeding and we are going 

to try and avoid this if possible.


Patient reports: Reports appetite normal, Reports voiding normally, Reports 

pain well controlled, Reports ambulating normally


[place]: doing well





Objective





- Latest Vital Signs


Latest vital signs: 


 Vital Signs











  Temp Pulse Resp BP


 


 18 08:00  98.7 F  79  16  124/72


 


 18 03:37  98.1 F  56 L  18  130/75


 


 18 00:00  98.1 F  77  18  124/72


 


 18 20:00  98.7 F  80  18  124/80


 


 18 15:24   87  18  115/58


 


 18 14:54   106 H  16  134/63


 


 18 14:24   71  16  115/58


 


 18 14:09  97.0 F L  78  16  119/76


 


 18 13:54   88  16  109/59


 


 18 13:39   82  16  106/58


 


 18 13:24   89  18  121/67








 Intake and Output











 18





 22:59 06:59 14:59


 


Intake Total  970 


 


Balance  970 


 


Intake:   


 


  Oral  970 


 


Other:   


 


  Voiding Method Toilet Toilet 


 


  # Voids 1 2 1














- Exam


Lungs: bilateral: normal


Chest: Normal S1, Normal S2


Extremities: Present: normal


Abdomen: Present: normal appearance, soft


Uterus: Present: normal, firm

## 2018-09-27 VITALS — HEART RATE: 80 BPM | TEMPERATURE: 99 F | SYSTOLIC BLOOD PRESSURE: 122 MMHG | DIASTOLIC BLOOD PRESSURE: 67 MMHG

## 2018-09-27 RX ADMIN — ACETAMINOPHEN PRN MG: 325 TABLET, FILM COATED ORAL at 14:56

## 2018-09-27 RX ADMIN — DOCUSATE SODIUM AND SENNOSIDES SCH: 50; 8.6 TABLET ORAL at 08:35

## 2018-09-27 RX ADMIN — ACETAMINOPHEN PRN MG: 325 TABLET, FILM COATED ORAL at 08:34

## 2018-09-27 RX ADMIN — IBUPROFEN PRN MG: 600 TABLET ORAL at 11:45

## 2018-09-27 RX ADMIN — IBUPROFEN PRN MG: 600 TABLET ORAL at 05:11

## 2018-09-27 RX ADMIN — ACETAMINOPHEN PRN MG: 325 TABLET, FILM COATED ORAL at 02:11

## 2018-09-27 NOTE — P.DS
Providers


Date of admission: 


09/25/18 06:34





Expected date of discharge: 09/27/18


Attending physician: 


Maxx Angulo





Primary care physician: 


Stated None





Hospital Course: 





In is seen and evaluated postpartum day 2.  She continues to have pelvic pain 

that is sort of generalized across the pelvis.  She relates the pain began 

shortly after epidural was placed and was present prior to her even beginning 

to push.  There is no other evidence for abnormality.  No sign of fracture, or 

separation of pubic bone although this is still a possibility.  Since there is 

limited treatment options for those anyway will plan to discharge her to home 

today and she will follow up with me in 1-2 weeks if the pain persists in 6 

weeks if it resolves.  Prescription for Motrin has been provided.  All the 

questions were answered for her and discharge instruction reviewed.  Vital 

signs are stable and afebrile.  Heart regular, lungs clear, extremities without 

pain.  Abdomen is soft uterus is firm and lochia is reported be light.  

Assessment postpartum day 2.  Plan discharged home follow up with me in 6 weeks.


Patient Condition at Discharge: Good





Plan - Discharge Summary


New Discharge Prescriptions: 


New


   Ibuprofen [Motrin] 600 mg PO Q6HR PRN #30 tab


     PRN Reason: Pain





No Action


   Pnv,Calcium 72/Iron/Folic Acid [Prenatal Plus Tablet] 1 tab PO DAILY


Discharge Medication List





Pnv,Calcium 72/Iron/Folic Acid [Prenatal Plus Tablet] 1 tab PO DAILY 03/09/18 [

History]


Ibuprofen [Motrin] 600 mg PO Q6HR PRN #30 tab 09/27/18 [Rx]








Follow up Appointment(s)/Referral(s): 


Maxx Angulo DO [Doctor of Osteopathic Medicine] - 6 Weeks


Activity/Diet/Wound Care/Special Instructions: 


No heavy lifting, limit stairs and driving, and pelvic rest.  If any high 

temperatures, heavy bleeding, or severe pain call my office


Discharge Disposition: HOME SELF-CARE